# Patient Record
Sex: FEMALE | Race: WHITE | NOT HISPANIC OR LATINO
[De-identification: names, ages, dates, MRNs, and addresses within clinical notes are randomized per-mention and may not be internally consistent; named-entity substitution may affect disease eponyms.]

---

## 2017-07-07 ENCOUNTER — RESULT REVIEW (OUTPATIENT)
Age: 47
End: 2017-07-07

## 2018-07-18 ENCOUNTER — RESULT REVIEW (OUTPATIENT)
Age: 48
End: 2018-07-18

## 2019-08-02 ENCOUNTER — RESULT REVIEW (OUTPATIENT)
Age: 49
End: 2019-08-02

## 2020-02-10 ENCOUNTER — RESULT REVIEW (OUTPATIENT)
Age: 50
End: 2020-02-10

## 2020-09-22 PROBLEM — Z00.00 ENCOUNTER FOR PREVENTIVE HEALTH EXAMINATION: Status: ACTIVE | Noted: 2020-09-22

## 2020-10-09 ENCOUNTER — RESULT REVIEW (OUTPATIENT)
Age: 50
End: 2020-10-09

## 2021-11-17 ENCOUNTER — NON-APPOINTMENT (OUTPATIENT)
Age: 51
End: 2021-11-17

## 2021-11-18 ENCOUNTER — APPOINTMENT (OUTPATIENT)
Dept: ORTHOPEDIC SURGERY | Facility: CLINIC | Age: 51
End: 2021-11-18
Payer: COMMERCIAL

## 2021-11-18 ENCOUNTER — TRANSCRIPTION ENCOUNTER (OUTPATIENT)
Age: 51
End: 2021-11-18

## 2021-11-18 DIAGNOSIS — Z87.19 PERSONAL HISTORY OF OTHER DISEASES OF THE DIGESTIVE SYSTEM: ICD-10-CM

## 2021-11-18 PROCEDURE — 99204 OFFICE O/P NEW MOD 45 MIN: CPT

## 2021-11-18 RX ORDER — IBUPROFEN 800 MG/1
800 TABLET, FILM COATED ORAL
Qty: 90 | Refills: 0 | Status: ACTIVE | COMMUNITY
Start: 2021-11-18 | End: 1900-01-01

## 2021-11-18 NOTE — HISTORY OF PRESENT ILLNESS
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  She has a history of minor twinges of pain in her back in the past.  She did not have back pain at that point.  She had an MRI that revealed stenosis and herniated disc and had a Medrol Dosepak and the pain resolved but she has had some ongoing symptoms of numbness and tingling in the left S1 nerve root distribution.  The symptoms are no worse coughing and sneezing but can be worse forcing to move her bowels.  The pain is no worse sitting but the numbness can be worse when getting up after sitting.  Interestingly the symptoms are no worse standing or walking.

## 2021-11-18 NOTE — DISCUSSION/SUMMARY
[Medication Risks Reviewed] : Medication risks reviewed [de-identified] : While she has marked stenosis she has no stenosis type symptoms.  There is no pain standing or walking.  The spondylolisthesis is at L4-5 but she has S1 nerve root sensory symptoms.  Currently fortunately she has no back or leg pain.  I have discussed with her that she does not have classic stenosis symptoms but despite the severe stenosis.  If pain were her complaint I would be more optimistic that her symptoms would respond to a course of nonsteroidal anti-inflammatory medication.  Sensory symptoms are much less predictably relieved with nonsteroidal anti-inflammatory medication but at this point she needs to try something for the ongoing numbness in the lateral border of the foot and the sole of the foot.  She has been started on ibuprofen 800 mg 3 times a day.  She will call if there are problems with the medication or worsening of her symptoms and I will see her for follow-up in 4 weeks.

## 2021-11-18 NOTE — REASON FOR VISIT
[Initial Visit] : an initial visit for [Herniated Discs] : herniated discs [Radiculopathy] : radiculopathy [Spinal Stenosis] : spinal stenosis [Spondylolistheses] : spondylolistheses

## 2021-11-18 NOTE — PHYSICAL EXAM
[de-identified] : She is fully alert and oriented with a normal mood and affect.  She is in no acute distress as I take the history.  She ambulates with a normal gait including tiptoe and heel walking.  There are no cutaneous abnormalities or palpable bony defects of the spine.  There is no evidence of shortness of breath or respiratory distress.  There is no paravertebral muscle spasm.  There is a trace of sciatic notch and trochanteric sensitivity on the left but none on the right.  Forward flexion and extension of the spine is painless.  Her lower extremity neurological examination revealed 1+ symmetrical reflexes.  Motor power is normal to manual testing in all lower extremity groups.  There is decreased sensation to light touch along the lateral border of the left foot.  Straight leg raising is negative to 90 degrees in the sitting position.  Her hips and her knees have a full and painless range of motion with normal stability. [de-identified] : I reviewed an outside MRI of the lumbar spine from December of last year.  There is a grade 1 degenerative spondylolisthesis at L4-5.  There is a small distally left-sided extruded fragment at that level and there is marked stenosis at the L4-5 level.

## 2021-12-15 ENCOUNTER — RESULT REVIEW (OUTPATIENT)
Age: 51
End: 2021-12-15

## 2021-12-16 ENCOUNTER — APPOINTMENT (OUTPATIENT)
Dept: ORTHOPEDIC SURGERY | Facility: CLINIC | Age: 51
End: 2021-12-16
Payer: COMMERCIAL

## 2021-12-16 DIAGNOSIS — M47.816 SPONDYLOSIS W/OUT MYELOPATHY OR RADICULOPATHY, LUMBAR REGION: ICD-10-CM

## 2021-12-16 DIAGNOSIS — M51.26 OTHER INTERVERTEBRAL DISC DISPLACEMENT, LUMBAR REGION: ICD-10-CM

## 2021-12-16 PROCEDURE — 99213 OFFICE O/P EST LOW 20 MIN: CPT

## 2021-12-16 RX ORDER — DICLOFENAC SODIUM 75 MG/1
75 TABLET, DELAYED RELEASE ORAL
Qty: 60 | Refills: 0 | Status: ACTIVE | COMMUNITY
Start: 2021-12-16 | End: 1900-01-01

## 2021-12-16 NOTE — DISCUSSION/SUMMARY
[Medication Risks Reviewed] : Medication risks reviewed [de-identified] : She will stay away from any extension type exercises as that will aggravate the stenosis.  I have discussed with her that treatments for persistent numbness are less predictably successful and for pain.  The ibuprofen did not help and she has been switched to diclofenac and I will see her for follow-up in 4 weeks.  We discussed if that fails to help that a short course of oral steroids may be of benefit and possibly going back to see Dr. Peter for epidural steroid injections.

## 2021-12-16 NOTE — REASON FOR VISIT
[Follow-Up Visit] : a follow-up visit for [Degenerative Joint Disease] : degenerative joint disease [Radiculopathy] : radiculopathy [Spinal Stenosis] : spinal stenosis [Spondylolistheses] : spondylolistheses

## 2021-12-16 NOTE — PHYSICAL EXAM
[de-identified] : On examination she has mild bilateral trochanteric and sciatic notch sensitivity.  Ankle reflexes remain 1-2+ and symmetrical and she can do a good single-leg toe lift bilaterally.

## 2021-12-16 NOTE — HISTORY OF PRESENT ILLNESS
[de-identified] : CurrentlyNubia returns for follow-up of her spine related symptoms.  She has a spondylolisthesis with a distally extruded herniated disc at the L4-5 level seen on an MRI from last year.  She has not had back pain but has symptoms of persistent numbness in the left foot.  She tells me though symptoms are worse in the morning and get better as the day goes by.  She has not had problems tolerating the ibuprofen but has not seen much improvement in her numbness.

## 2022-01-13 ENCOUNTER — APPOINTMENT (OUTPATIENT)
Dept: ORTHOPEDIC SURGERY | Facility: CLINIC | Age: 52
End: 2022-01-13
Payer: COMMERCIAL

## 2022-01-13 DIAGNOSIS — M54.16 RADICULOPATHY, LUMBAR REGION: ICD-10-CM

## 2022-01-13 DIAGNOSIS — M48.061 SPINAL STENOSIS, LUMBAR REGION WITHOUT NEUROGENIC CLAUDICATION: ICD-10-CM

## 2022-01-13 DIAGNOSIS — M43.16 SPONDYLOLISTHESIS, LUMBAR REGION: ICD-10-CM

## 2022-01-13 PROCEDURE — 99213 OFFICE O/P EST LOW 20 MIN: CPT

## 2022-01-13 RX ORDER — METHYLPREDNISOLONE 4 MG/1
4 TABLET ORAL
Qty: 21 | Refills: 0 | Status: ACTIVE | COMMUNITY
Start: 2022-01-13 | End: 1900-01-01

## 2022-01-13 NOTE — HISTORY OF PRESENT ILLNESS
[de-identified] : She has not had problems tolerating the diclofenac.  She continues without any complaints of back or leg pain but has some persistent left S1 numbness.

## 2022-01-13 NOTE — REASON FOR VISIT
[Follow-Up Visit] : a follow-up visit for [Degenerative Joint Disease] : degenerative joint disease [Herniated Discs] : herniated discs [Radiculopathy] : radiculopathy [Spinal Stenosis] : spinal stenosis [Spondylolistheses] : spondylolistheses

## 2022-01-13 NOTE — DISCUSSION/SUMMARY
[Medication Risks Reviewed] : Medication risks reviewed [de-identified] : She had a distally extruded herniated disc at L4-5 with stenosis at the level associated with a spondylolisthesis.  She has been seen for persistent numbness but has not had back or leg pain.  She has not seen benefit with ibuprofen or diclofenac.  We discussed her further options at this point.  At this point I have recommended a Medrol Dosepak which helped with her pain when her symptoms initially presented in December 2020.  If that fails to give her relief I discussed the options of proceeding with an epidural steroid injection although that would not be predictably effective for sensory symptoms only.  We also discussed the possibility of acupuncture.

## 2023-01-11 ENCOUNTER — APPOINTMENT (OUTPATIENT)
Dept: OBGYN | Facility: CLINIC | Age: 53
End: 2023-01-11
Payer: COMMERCIAL

## 2023-01-11 PROCEDURE — 81002 URINALYSIS NONAUTO W/O SCOPE: CPT

## 2023-01-11 PROCEDURE — 99396 PREV VISIT EST AGE 40-64: CPT | Mod: 25

## 2023-01-11 PROCEDURE — 82270 OCCULT BLOOD FECES: CPT

## 2023-02-27 ENCOUNTER — NON-APPOINTMENT (OUTPATIENT)
Age: 53
End: 2023-02-27

## 2024-03-06 ENCOUNTER — APPOINTMENT (OUTPATIENT)
Dept: OBGYN | Facility: CLINIC | Age: 54
End: 2024-03-06
Payer: COMMERCIAL

## 2024-03-06 PROCEDURE — 81002 URINALYSIS NONAUTO W/O SCOPE: CPT

## 2024-03-06 PROCEDURE — 82270 OCCULT BLOOD FECES: CPT

## 2024-03-06 PROCEDURE — 99396 PREV VISIT EST AGE 40-64: CPT

## 2025-03-18 ENCOUNTER — APPOINTMENT (OUTPATIENT)
Dept: OBGYN | Facility: CLINIC | Age: 55
End: 2025-03-18

## 2025-03-18 PROCEDURE — 99459 PELVIC EXAMINATION: CPT | Mod: NC

## 2025-03-18 PROCEDURE — 81002 URINALYSIS NONAUTO W/O SCOPE: CPT

## 2025-03-18 PROCEDURE — 99396 PREV VISIT EST AGE 40-64: CPT

## 2025-04-29 ENCOUNTER — APPOINTMENT (OUTPATIENT)
Dept: ORTHOPEDIC SURGERY | Facility: CLINIC | Age: 55
End: 2025-04-29